# Patient Record
Sex: MALE | Race: WHITE | Employment: OTHER | URBAN - METROPOLITAN AREA
[De-identification: names, ages, dates, MRNs, and addresses within clinical notes are randomized per-mention and may not be internally consistent; named-entity substitution may affect disease eponyms.]

---

## 2022-05-10 ENCOUNTER — HOSPITAL ENCOUNTER (EMERGENCY)
Age: 22
Discharge: HOME | End: 2022-05-10
Attending: EMERGENCY MEDICINE
Payer: OTHER GOVERNMENT

## 2022-05-10 ENCOUNTER — APPOINTMENT (OUTPATIENT)
Dept: CT IMAGING | Age: 22
End: 2022-05-10
Attending: EMERGENCY MEDICINE
Payer: OTHER GOVERNMENT

## 2022-05-10 VITALS
BODY MASS INDEX: 23.1 KG/M2 | OXYGEN SATURATION: 99 % | SYSTOLIC BLOOD PRESSURE: 123 MMHG | HEIGHT: 71 IN | TEMPERATURE: 97.5 F | DIASTOLIC BLOOD PRESSURE: 72 MMHG | WEIGHT: 165 LBS | RESPIRATION RATE: 18 BRPM | HEART RATE: 73 BPM

## 2022-05-10 DIAGNOSIS — B27.90 ACUTE PHARYNGITIS DUE TO INFECTIOUS MONONUCLEOSIS: Primary | ICD-10-CM

## 2022-05-10 LAB
ANION GAP SERPL CALC-SCNC: 3 MMOL/L (ref 3–18)
BASOPHILS # BLD: 0 K/UL (ref 0–0.1)
BASOPHILS NFR BLD: 0 % (ref 0–2)
BUN SERPL-MCNC: 9 MG/DL (ref 7–18)
BUN/CREAT SERPL: 11 (ref 12–20)
CALCIUM SERPL-MCNC: 8.7 MG/DL (ref 8.5–10.1)
CHLORIDE SERPL-SCNC: 107 MMOL/L (ref 100–111)
CO2 SERPL-SCNC: 30 MMOL/L (ref 21–32)
CREAT SERPL-MCNC: 0.8 MG/DL (ref 0.6–1.3)
DIFFERENTIAL METHOD BLD: ABNORMAL
EOSINOPHIL # BLD: 0.2 K/UL (ref 0–0.4)
EOSINOPHIL NFR BLD: 2 % (ref 0–5)
ERYTHROCYTE [DISTWIDTH] IN BLOOD BY AUTOMATED COUNT: 13.1 % (ref 11.6–14.5)
GLUCOSE SERPL-MCNC: 91 MG/DL (ref 74–99)
HCT VFR BLD AUTO: 44.9 % (ref 36–48)
HETEROPH AB SER QL: POSITIVE
HGB BLD-MCNC: 15.1 G/DL (ref 13–16)
IMM GRANULOCYTES # BLD AUTO: 0 K/UL
IMM GRANULOCYTES NFR BLD AUTO: 0 %
LYMPHOCYTES # BLD: 2.9 K/UL (ref 0.9–3.6)
LYMPHOCYTES NFR BLD: 31 % (ref 21–52)
MCH RBC QN AUTO: 30.5 PG (ref 24–34)
MCHC RBC AUTO-ENTMCNC: 33.6 G/DL (ref 31–37)
MCV RBC AUTO: 90.7 FL (ref 78–100)
MONOCYTES # BLD: 0.2 K/UL (ref 0.05–1.2)
MONOCYTES NFR BLD: 2 % (ref 3–10)
NEUTS SEG # BLD: 6 K/UL (ref 1.8–8)
NEUTS SEG NFR BLD: 65 % (ref 40–73)
NRBC # BLD: 0 K/UL (ref 0–0.01)
NRBC BLD-RTO: 0 PER 100 WBC
PLATELET # BLD AUTO: 307 K/UL (ref 135–420)
PMV BLD AUTO: 9.9 FL (ref 9.2–11.8)
POTASSIUM SERPL-SCNC: 4.3 MMOL/L (ref 3.5–5.5)
RBC # BLD AUTO: 4.95 M/UL (ref 4.35–5.65)
RBC MORPH BLD: ABNORMAL
S PYO AG THROAT QL: NEGATIVE
SODIUM SERPL-SCNC: 140 MMOL/L (ref 136–145)
WBC # BLD AUTO: 9.3 K/UL (ref 4.6–13.2)

## 2022-05-10 PROCEDURE — 74011250636 HC RX REV CODE- 250/636: Performed by: EMERGENCY MEDICINE

## 2022-05-10 PROCEDURE — 96365 THER/PROPH/DIAG IV INF INIT: CPT

## 2022-05-10 PROCEDURE — 87147 CULTURE TYPE IMMUNOLOGIC: CPT

## 2022-05-10 PROCEDURE — 70491 CT SOFT TISSUE NECK W/DYE: CPT

## 2022-05-10 PROCEDURE — 96375 TX/PRO/DX INJ NEW DRUG ADDON: CPT

## 2022-05-10 PROCEDURE — 87070 CULTURE OTHR SPECIMN AEROBIC: CPT

## 2022-05-10 PROCEDURE — 86308 HETEROPHILE ANTIBODY SCREEN: CPT

## 2022-05-10 PROCEDURE — 74011000258 HC RX REV CODE- 258: Performed by: EMERGENCY MEDICINE

## 2022-05-10 PROCEDURE — 85025 COMPLETE CBC W/AUTO DIFF WBC: CPT

## 2022-05-10 PROCEDURE — 87880 STREP A ASSAY W/OPTIC: CPT

## 2022-05-10 PROCEDURE — 80048 BASIC METABOLIC PNL TOTAL CA: CPT

## 2022-05-10 PROCEDURE — 74011000636 HC RX REV CODE- 636: Performed by: EMERGENCY MEDICINE

## 2022-05-10 PROCEDURE — 99285 EMERGENCY DEPT VISIT HI MDM: CPT

## 2022-05-10 RX ORDER — DEXAMETHASONE SODIUM PHOSPHATE 10 MG/ML
10 INJECTION INTRAMUSCULAR; INTRAVENOUS
Status: COMPLETED | OUTPATIENT
Start: 2022-05-10 | End: 2022-05-10

## 2022-05-10 RX ADMIN — SODIUM CHLORIDE 3 G: 900 INJECTION INTRAVENOUS at 11:27

## 2022-05-10 RX ADMIN — IOPAMIDOL 100 ML: 612 INJECTION, SOLUTION INTRAVENOUS at 12:30

## 2022-05-10 RX ADMIN — DEXAMETHASONE SODIUM PHOSPHATE 10 MG: 10 INJECTION INTRAMUSCULAR; INTRAVENOUS at 11:27

## 2022-05-10 NOTE — ED TRIAGE NOTES
Pt arrived ambulatory to triage with c/o peritonsillar abscess to left side x 1 week  [place]

## 2022-05-10 NOTE — ED PROVIDER NOTES
EMERGENCY DEPARTMENT HISTORY AND PHYSICAL EXAM    Date: 5/10/2022  Patient Name: Shwetha Hodges    History of Presenting Illness     Chief Complaint   Patient presents with    Abscess         History Provided By: Patient    11:07 AM  Shwetha Hodges is a 19 y.o. 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 Patient reports he has had throat pain for about a week. He reports is primarily left-sided and he has noticed more swelling of that tonsil. States he was swabbed about a week ago for strep and it was negative. Denies any fever, cough, shortness of breath, chest pain, other complaints. [place] who instructed him to come to the ED for concern of possible PTA. PCP: Maame, MD Aishwarya        Past History       Past Medical History:  No past medical history on file. Past Surgical History:  No past surgical history on file. Family History:  No family history on file. Social History:  Social History     Tobacco Use    Smoking status: Not on file    Smokeless tobacco: Not on file   Substance Use Topics    Alcohol use: Not on file    Drug use: Not on file       Allergies:  No Known Allergies      Review of Systems   Review of Systems   Constitutional: Negative for fever. HENT: Positive for sore throat. Respiratory: Negative for cough. Cardiovascular: Negative for chest pain. All other systems reviewed and are negative.         Physical Exam     Vitals:    05/10/22 1105 05/10/22 1224   BP: 123/72    Pulse: 73    Resp: 18    Temp: 97.5 °F (36.4 °C)    SpO2: 97% 99%   Weight: 74.8 kg (165 lb)    Height: 5' 11\" (1.803 m)      Physical Exam    Nursing notes and vital signs reviewed    Constitutional: Non toxic appearing, moderate distress  Head: Normocephalic, Atraumatic  Eyes: EOMI  ENT: Bilateral tonsils are edematous, left is more swollen than right, both have exudate scattered over them, uvula is midline, moist mucous membranes  Neck: Supple  Cardiovascular: Regular rate and rhythm, no murmurs, rubs, or gallops  Chest: Normal work of breathing and chest excursion bilaterally  Lungs: Clear to ausculation bilaterally  Back: No evidence of trauma or deformity  Extremities: No evidence of trauma or deformity, no LE edema  Skin: Warm and dry, normal cap refill  Neuro: Alert and appropriate  Psychiatric: Normal mood and affect      Diagnostic Study Results     Labs -     Recent Results (from the past 12 hour(s))   CBC WITH AUTOMATED DIFF    Collection Time: 05/10/22 11:23 AM   Result Value Ref Range    WBC 9.3 4.6 - 13.2 K/uL    RBC 4.95 4.35 - 5.65 M/uL    HGB 15.1 13.0 - 16.0 g/dL    HCT 44.9 36.0 - 48.0 %    MCV 90.7 78.0 - 100.0 FL    MCH 30.5 24.0 - 34.0 PG    MCHC 33.6 31.0 - 37.0 g/dL    RDW 13.1 11.6 - 14.5 %    PLATELET 431 818 - 706 K/uL    MPV 9.9 9.2 - 11.8 FL    NRBC 0.0 0  WBC    ABSOLUTE NRBC 0.00 0.00 - 0.01 K/uL    NEUTROPHILS 65 40 - 73 %    LYMPHOCYTES 31 21 - 52 %    MONOCYTES 2 (L) 3 - 10 %    EOSINOPHILS 2 0 - 5 %    BASOPHILS 0 0 - 2 %    IMMATURE GRANULOCYTES 0 %    ABS. NEUTROPHILS 6.0 1.8 - 8.0 K/UL    ABS. LYMPHOCYTES 2.9 0.9 - 3.6 K/UL    ABS. MONOCYTES 0.2 0.05 - 1.2 K/UL    ABS. EOSINOPHILS 0.2 0.0 - 0.4 K/UL    ABS. BASOPHILS 0.0 0.0 - 0.1 K/UL    ABS. IMM.  GRANS. 0.0 K/UL    DF MANUAL      RBC COMMENTS NORMOCYTIC, NORMOCHROMIC     METABOLIC PANEL, BASIC    Collection Time: 05/10/22 11:23 AM   Result Value Ref Range    Sodium 140 136 - 145 mmol/L    Potassium 4.3 3.5 - 5.5 mmol/L    Chloride 107 100 - 111 mmol/L    CO2 30 21 - 32 mmol/L    Anion gap 3 3.0 - 18 mmol/L    Glucose 91 74 - 99 mg/dL    BUN 9 7.0 - 18 MG/DL    Creatinine 0.80 0.6 - 1.3 MG/DL    BUN/Creatinine ratio 11 (L) 12 - 20      GFR est AA >60 >60 ml/min/1.73m2    GFR est non-AA >60 >60 ml/min/1.73m2    Calcium 8.7 8.5 - 10.1 MG/DL   MONONUCLEOSIS SCREEN    Collection Time: 05/10/22 11:23 AM   Result Value Ref Range    Mononucleosis screen Positive (A) NEG     POC GROUP A STREP    Collection Time: 05/10/22 11:34 AM   Result Value Ref Range    Group A strep (POC) Negative NEG         Radiologic Studies -   CT NECK SOFT TISSUE W CONT   Final Result         1. Soft tissue induration of the tonsillar soft tissues described above without   evidence of an organized or drainable peritonsillar fluid collection. CT Results  (Last 48 hours)               05/10/22 1235  CT NECK SOFT TISSUE W CONT Final result    Impression:          1. Soft tissue induration of the tonsillar soft tissues described above without   evidence of an organized or drainable peritonsillar fluid collection. Narrative:  EXAM: CT NECK       CLINICAL INDICATION/HISTORY:  Left-sided neck pain and swelling.   -Additional: None       COMPARISON: None       TECHNIQUE: Serial axial images were obtained from the skull base to the thoracic   inlet with IV contrast.  Sagittal and coronal reformations were obtained from   the source data. Automated exposure control, mAs and/or kVp reductions based on   patient size, and iterative reconstruction. The specific techniques utilized on   this CT exam have been documented in the patient's electronic medical record. Digital Imaging and Communications in Medicine (DICOM) format image data are   available to nonaffiliated external healthcare facilities or entities on a   secure, media free, reciprocally searchable basis with patient authorization for   at least a 12-month period after this study. _______________       FINDINGS:       NASOPHARYNX/OROPHARYNX/HYPOPHARYNX:  Induration of both lingual and palatine   tonsils demonstrated. The degree of tonsillar edema asymmetrically involves the   left tonsillar pillar; however no evidence of an organized or drainable   peritonsillar fluid collection is present. ORAL CAVITY: Unremarkable. GLOTTIC REGION:  Unremarkable. PAROTID/SUBMANDIBULAR/SUBLINGUAL GLANDS:  Unremarkable. THYROID/PARATHYROID GLANDS:  Unremarkable. LYMPH NODES:  Increased number of short axis cervical chain lymph nodes are   present bilaterally. Very likely reactive in etiology. VISIBLE PORTIONS OF POSTERIOR FOSSA/BRAIN: Unremarkable. PARANASAL SINUSES AND MASTOID AIR CELLS:  Mucous retention cyst or polyp within   the right maxillary sinus. Additional small mucous retention cyst or polyp is   present within the left maxillary sinus. LUNG APICES: Clear. OTHER: None.       _______________               CXR Results  (Last 48 hours)    None          Medications given in the ED-  Medications   dexamethasone (PF) (DECADRON) 10 mg/mL injection 10 mg (10 mg IntraVENous Given 5/10/22 1127)   ampicillin-sulbactam (UNASYN) 3 g in 0.9% sodium chloride (MBP/ADV) 100 mL MBP (0 g IntraVENous IV Completed 5/10/22 1157)   iopamidoL (ISOVUE 300) 61 % contrast injection 100 mL (100 mL IntraVENous Given 5/10/22 1230)         Medical Decision Making   I am the first provider for this patient. I reviewed the vital signs, available nursing notes, past medical history, past surgical history, family history and social history. Vital Signs-Reviewed the patient's vital signs. Pulse Oximetry Analysis - 99% on room air, not hypoxic     Records Reviewed: Nursing Notes    Provider Notes (Medical Decision Making): Tosha Ledezma is a 19 y.o. male [place]. Patient does have asymmetric tonsils so imaging was obtained which reveals inflammation but no abscess. Patient is hemodynamically stable without any evidence of airway compromise. Patient was given IV steroids and antibiotics here during evaluation. Strep swab was sent and negative for strep but patient did test positive for mononucleosis which is likely the cause of his symptoms.   Counseled patient on importance of avoiding any contact sports or any other impacts abdomen for concern for splenomegaly that can occur with mononucleosis in the importance of follow[place] before resuming normal activities. Provided with strict return precautions. Patient understands and agrees with this plan. Procedures:  Procedures    ED Course:   1:31 PM  Updated patient on all results and plan. All questions answered. Diagnosis and Disposition     Critical Care: Nnoe    DISCHARGE NOTE:    Ayush Treviño's  results have been reviewed with him. He has been counseled regarding his diagnosis, treatment, and plan. He verbally conveys understanding and agreement of the signs, symptoms, diagnosis, treatment and prognosis and additionally agrees to follow up as discussed. He also agrees with the care-plan and conveys that all of his questions have been answered. I have also provided discharge instructions for him that include: educational information regarding their diagnosis and treatment, and list of reasons why they would want to return to the ED prior to their follow-up appointment, should his condition change. He has been provided with education for proper emergency department utilization. CLINICAL IMPRESSION:    1. Acute pharyngitis due to infectious mononucleosis        PLAN:  1. D/C Home  2. There are no discharge medications for this patient. 3.   Follow-up Information     Follow up With Specialties Details Why [address] [facility]  Schedule an appointment as soon as possible for a visit   [address] [facility] [address]  [phone]    [facility] EMERGENCY DEPT Emergency Medicine  If symptoms worsen [address] [place] [address]  [phone]        _______________________________      Please note that this dictation was completed with [employer], the [employer] voice recognition software.   Quite often unanticipated grammatical, syntax, homophones, and other interpretive errors are inadvertently transcribed by the computer software. Please disregard these errors. Please excuse any errors that have escaped final proofreading.

## 2022-05-12 LAB
BACTERIA SPEC CULT: ABNORMAL
BACTERIA SPEC CULT: ABNORMAL
SERVICE CMNT-IMP: ABNORMAL

## 2022-05-12 RX ORDER — AMOXICILLIN 500 MG/1
500 TABLET, FILM COATED ORAL 3 TIMES DAILY
Qty: 30 TABLET | Refills: 0 | Status: SHIPPED | OUTPATIENT
Start: 2022-05-12 | End: 2022-05-22

## 2022-05-12 NOTE — CALL BACK NOTE
5:11 PM  2022    See previous call back note. Pt returned call. Verified  and NKDA. Pt still having sore throat. Sent amox to [facility]. Charles DONOVAN.      Ludwig Rae PA-C

## 2022-05-12 NOTE — CALL BACK NOTE
5:01 PM  05/12/2022    + throat culture. Pt has known mono. Called patient. No answer. Not able to leave VM. Called patient contact. Spoke to wife. She will have patient call ED back ASAP.      Jasmin Zamorano PA-C